# Patient Record
Sex: MALE | Race: WHITE | Employment: STUDENT | URBAN - METROPOLITAN AREA
[De-identification: names, ages, dates, MRNs, and addresses within clinical notes are randomized per-mention and may not be internally consistent; named-entity substitution may affect disease eponyms.]

---
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
## 2022-05-23 ENCOUNTER — OFFICE VISIT (OUTPATIENT)
Dept: URGENT CARE | Facility: URGENT CARE | Age: 22
End: 2022-05-23
Payer: COMMERCIAL

## 2022-05-23 VITALS
RESPIRATION RATE: 14 BRPM | OXYGEN SATURATION: 98 % | BODY MASS INDEX: 22.44 KG/M2 | DIASTOLIC BLOOD PRESSURE: 73 MMHG | HEART RATE: 64 BPM | WEIGHT: 185.6 LBS | TEMPERATURE: 97.8 F | SYSTOLIC BLOOD PRESSURE: 116 MMHG

## 2022-05-23 DIAGNOSIS — R05.9 COUGH: ICD-10-CM

## 2022-05-23 DIAGNOSIS — Z20.822 SUSPECTED 2019 NOVEL CORONAVIRUS INFECTION: Primary | ICD-10-CM

## 2022-05-23 PROCEDURE — 99203 OFFICE O/P NEW LOW 30 MIN: CPT | Performed by: FAMILY MEDICINE

## 2022-05-23 PROCEDURE — U0005 INFEC AGEN DETEC AMPLI PROBE: HCPCS | Performed by: FAMILY MEDICINE

## 2022-05-23 PROCEDURE — U0003 INFECTIOUS AGENT DETECTION BY NUCLEIC ACID (DNA OR RNA); SEVERE ACUTE RESPIRATORY SYNDROME CORONAVIRUS 2 (SARS-COV-2) (CORONAVIRUS DISEASE [COVID-19]), AMPLIFIED PROBE TECHNIQUE, MAKING USE OF HIGH THROUGHPUT TECHNOLOGIES AS DESCRIBED BY CMS-2020-01-R: HCPCS | Performed by: FAMILY MEDICINE

## 2022-05-23 NOTE — PROGRESS NOTES
Assessment & Plan     Suspected 2019 novel coronavirus infection  Cough and sore throat started 2 days after COVID exposure.  Suspect COVID.  Normal exam.  PCR collected and pending, isolate pending results.  Symptomatic measures and reasons to be reevaluated discussed.  Isolation period discussed.  - Symptomatic; Yes; 5/22/2022 COVID-19 Virus (Coronavirus) by PCR Nose                   No follow-ups on file.    Lina Brown MD  [facility]    Subjective   Sloan is a 22 year old who presents for the following health issues   Chief Complaint   Patient presents with     Covid Concern     Sore throat & cough started Sunday morning   Exposed on Friday      HPI   Sore throat and cough started yesterday.  Subjective fever, feeling warm at times.  Has not checked his temperature.  Exposed to coronavirus 2 days prior to symptom onset.  Vaccinated, not boosted.  Reports he is generally healthy, no chronic lung disease or asthma, no heart disease, liver disease, kidney disease.  Felt like it was a little harder to breathe yesterday morning when he woke up, but no shortness of breath since, none today.        Review of Systems         Objective    /73   Pulse 64   Temp 97.8  F (36.6  C) (Tympanic)   Resp 14   Wt 84.2 kg (185 lb 9.6 oz)   SpO2 98%   BMI 22.44 kg/m    Body mass index is 22.44 kg/m .  Physical Exam   GENERAL: healthy, alert and no distress  EYES: Eyes grossly normal to inspection, PERRL and conjunctivae and sclerae normal  HENT: ear canals and TM's normal, nose and mouth without ulcers or lesions  NECK: no adenopathy  RESP: lungs clear to auscultation - no rales, rhonchi or wheezes  CV: regular rate and rhythm, normal S1 S2, no S3 or S4, no murmur, click or rub, no peripheral edema   MS: no gross musculoskeletal defects noted, no edema

## 2022-05-24 LAB — SARS-COV-2 RNA RESP QL NAA+PROBE: NEGATIVE
